# Patient Record
Sex: FEMALE | Race: WHITE | NOT HISPANIC OR LATINO | Employment: UNEMPLOYED | URBAN - METROPOLITAN AREA
[De-identification: names, ages, dates, MRNs, and addresses within clinical notes are randomized per-mention and may not be internally consistent; named-entity substitution may affect disease eponyms.]

---

## 2020-11-17 ENCOUNTER — OFFICE VISIT (OUTPATIENT)
Dept: OBGYN CLINIC | Facility: CLINIC | Age: 45
End: 2020-11-17
Payer: COMMERCIAL

## 2020-11-17 VITALS — TEMPERATURE: 98.4 F | SYSTOLIC BLOOD PRESSURE: 122 MMHG | DIASTOLIC BLOOD PRESSURE: 80 MMHG | WEIGHT: 167 LBS

## 2020-11-17 DIAGNOSIS — Z11.3 SCREEN FOR STD (SEXUALLY TRANSMITTED DISEASE): Primary | ICD-10-CM

## 2020-11-17 DIAGNOSIS — L29.2 VULVAR ITCHING: ICD-10-CM

## 2020-11-17 LAB
BV WHIFF TEST VAG QL: NORMAL
CLUE CELLS SPEC QL WET PREP: NEGATIVE
PH SMN: NORMAL [PH]
SL AMB POCT WET MOUNT: NORMAL
T VAGINALIS VAG QL WET PREP: NEGATIVE
YEAST VAG QL WET PREP: NEGATIVE

## 2020-11-17 PROCEDURE — 87210 SMEAR WET MOUNT SALINE/INK: CPT | Performed by: STUDENT IN AN ORGANIZED HEALTH CARE EDUCATION/TRAINING PROGRAM

## 2020-11-17 PROCEDURE — 87491 CHLMYD TRACH DNA AMP PROBE: CPT | Performed by: STUDENT IN AN ORGANIZED HEALTH CARE EDUCATION/TRAINING PROGRAM

## 2020-11-17 PROCEDURE — 99213 OFFICE O/P EST LOW 20 MIN: CPT | Performed by: STUDENT IN AN ORGANIZED HEALTH CARE EDUCATION/TRAINING PROGRAM

## 2020-11-17 PROCEDURE — 87661 TRICHOMONAS VAGINALIS AMPLIF: CPT | Performed by: STUDENT IN AN ORGANIZED HEALTH CARE EDUCATION/TRAINING PROGRAM

## 2020-11-17 PROCEDURE — 87481 CANDIDA DNA AMP PROBE: CPT | Performed by: STUDENT IN AN ORGANIZED HEALTH CARE EDUCATION/TRAINING PROGRAM

## 2020-11-17 PROCEDURE — 87591 N.GONORRHOEAE DNA AMP PROB: CPT | Performed by: STUDENT IN AN ORGANIZED HEALTH CARE EDUCATION/TRAINING PROGRAM

## 2020-11-17 PROCEDURE — 87801 DETECT AGNT MULT DNA AMPLI: CPT | Performed by: STUDENT IN AN ORGANIZED HEALTH CARE EDUCATION/TRAINING PROGRAM

## 2020-11-17 RX ORDER — OMEPRAZOLE 40 MG/1
40 CAPSULE, DELAYED RELEASE ORAL AS NEEDED
COMMUNITY

## 2020-11-17 RX ORDER — ASPIRIN 81 MG/1
1 TABLET ORAL DAILY
COMMUNITY

## 2020-11-17 RX ORDER — COPPER 313.4 MG/1
1 INTRAUTERINE DEVICE INTRAUTERINE ONCE
COMMUNITY

## 2020-11-18 ENCOUNTER — TELEPHONE (OUTPATIENT)
Dept: OBGYN CLINIC | Facility: CLINIC | Age: 45
End: 2020-11-18

## 2020-11-18 LAB
C GLABRATA DNA VAG QL NAA+PROBE: NEGATIVE
C KRUSEI DNA VAG QL NAA+PROBE: NEGATIVE
CANDIDA SP 6 PNL VAG NAA+PROBE: NEGATIVE
T VAGINALIS DNA VAG QL NAA+PROBE: NEGATIVE
VAGINOSIS/ITIS DNA PNL VAG PROBE+SIG AMP: NEGATIVE

## 2020-11-19 LAB
C TRACH DNA SPEC QL NAA+PROBE: NEGATIVE
N GONORRHOEA DNA SPEC QL NAA+PROBE: NEGATIVE

## 2020-12-01 ENCOUNTER — TELEPHONE (OUTPATIENT)
Dept: OBGYN CLINIC | Facility: CLINIC | Age: 45
End: 2020-12-01

## 2020-12-01 DIAGNOSIS — L29.2 VULVAR ITCHING: Primary | ICD-10-CM

## 2020-12-01 RX ORDER — CLOBETASOL PROPIONATE 0.5 MG/G
OINTMENT TOPICAL 2 TIMES DAILY
Qty: 30 G | Refills: 0 | Status: SHIPPED | OUTPATIENT
Start: 2020-12-01

## 2021-01-04 ENCOUNTER — TELEPHONE (OUTPATIENT)
Dept: OBGYN CLINIC | Facility: CLINIC | Age: 46
End: 2021-01-04

## 2021-01-04 NOTE — TELEPHONE ENCOUNTER
----- Message from Kiya Maki sent at 1/4/2021  2:00 PM EST -----  Regarding: Visit Follow-Up Question  Contact: [phone]  Hi Dr Ping Jackson,    I hope you had a [address]! The cream helps but I still have the redness/itchy issue  I know your team mentioned a biopsy  Please let me know if that is the direction we should go in? My insurance starts Frankie 15 and I should get my period around that time  Thank you for your help     Lolly Malcolm

## 2021-01-04 NOTE — TELEPHONE ENCOUNTER
Yes I would recommend a biopsy when your insurance is all set  The alternative would be to get a second opinion with dermatology which we can do pre or post biopsy

## 2021-01-20 ENCOUNTER — OFFICE VISIT (OUTPATIENT)
Dept: FAMILY MEDICINE CLINIC | Facility: CLINIC | Age: 46
End: 2021-01-20
Payer: COMMERCIAL

## 2021-01-20 ENCOUNTER — APPOINTMENT (OUTPATIENT)
Dept: LAB | Facility: HOSPITAL | Age: 46
End: 2021-01-20
Attending: FAMILY MEDICINE
Payer: COMMERCIAL

## 2021-01-20 ENCOUNTER — TELEPHONE (OUTPATIENT)
Dept: FAMILY MEDICINE CLINIC | Facility: CLINIC | Age: 46
End: 2021-01-20

## 2021-01-20 VITALS
SYSTOLIC BLOOD PRESSURE: 130 MMHG | OXYGEN SATURATION: 98 % | TEMPERATURE: 97.2 F | WEIGHT: 160 LBS | HEART RATE: 100 BPM | DIASTOLIC BLOOD PRESSURE: 76 MMHG | BODY MASS INDEX: 25.11 KG/M2 | HEIGHT: 67 IN

## 2021-01-20 DIAGNOSIS — Z11.3 SCREEN FOR STD (SEXUALLY TRANSMITTED DISEASE): ICD-10-CM

## 2021-01-20 DIAGNOSIS — Z13.6 ENCOUNTER FOR LIPID SCREENING FOR CARDIOVASCULAR DISEASE: ICD-10-CM

## 2021-01-20 DIAGNOSIS — Z12.31 ENCOUNTER FOR SCREENING MAMMOGRAM FOR BREAST CANCER: ICD-10-CM

## 2021-01-20 DIAGNOSIS — Z13.220 ENCOUNTER FOR LIPID SCREENING FOR CARDIOVASCULAR DISEASE: Primary | ICD-10-CM

## 2021-01-20 DIAGNOSIS — Z00.00 ANNUAL PHYSICAL EXAM: Primary | ICD-10-CM

## 2021-01-20 DIAGNOSIS — Z13.220 ENCOUNTER FOR LIPID SCREENING FOR CARDIOVASCULAR DISEASE: ICD-10-CM

## 2021-01-20 DIAGNOSIS — Z13.6 ENCOUNTER FOR LIPID SCREENING FOR CARDIOVASCULAR DISEASE: Primary | ICD-10-CM

## 2021-01-20 DIAGNOSIS — Z00.00 ANNUAL PHYSICAL EXAM: ICD-10-CM

## 2021-01-20 DIAGNOSIS — R20.1 IMPAIRED SENSATION: ICD-10-CM

## 2021-01-20 PROBLEM — Z83.3 FAMILY HISTORY OF DIABETES MELLITUS IN FATHER: Status: ACTIVE | Noted: 2020-07-21

## 2021-01-20 PROBLEM — Z86.718 HISTORY OF DVT (DEEP VEIN THROMBOSIS): Status: ACTIVE | Noted: 2020-07-21

## 2021-01-20 LAB
ANION GAP SERPL CALCULATED.3IONS-SCNC: 8 MMOL/L (ref 4–13)
BASOPHILS # BLD AUTO: 0.07 THOUSANDS/ΜL (ref 0–0.1)
BASOPHILS NFR BLD AUTO: 1 % (ref 0–1)
BUN SERPL-MCNC: 19 MG/DL (ref 5–25)
CALCIUM SERPL-MCNC: 9 MG/DL (ref 8.3–10.1)
CHLORIDE SERPL-SCNC: 104 MMOL/L (ref 100–108)
CHOLEST SERPL-MCNC: 186 MG/DL (ref 50–200)
CO2 SERPL-SCNC: 29 MMOL/L (ref 21–32)
CREAT SERPL-MCNC: 1.11 MG/DL (ref 0.6–1.3)
EOSINOPHIL # BLD AUTO: 0.15 THOUSAND/ΜL (ref 0–0.61)
EOSINOPHIL NFR BLD AUTO: 3 % (ref 0–6)
ERYTHROCYTE [DISTWIDTH] IN BLOOD BY AUTOMATED COUNT: 12.4 % (ref 11.6–15.1)
GFR SERPL CREATININE-BSD FRML MDRD: 61 ML/MIN/1.73SQ M
GLUCOSE P FAST SERPL-MCNC: 90 MG/DL (ref 65–99)
HCT VFR BLD AUTO: 45.5 % (ref 34.8–46.1)
HDLC SERPL-MCNC: 56 MG/DL
HGB BLD-MCNC: 14.7 G/DL (ref 11.5–15.4)
IMM GRANULOCYTES # BLD AUTO: 0.01 THOUSAND/UL (ref 0–0.2)
IMM GRANULOCYTES NFR BLD AUTO: 0 % (ref 0–2)
LDLC SERPL CALC-MCNC: 98 MG/DL (ref 0–100)
LYMPHOCYTES # BLD AUTO: 1.95 THOUSANDS/ΜL (ref 0.6–4.47)
LYMPHOCYTES NFR BLD AUTO: 35 % (ref 14–44)
MCH RBC QN AUTO: 28.4 PG (ref 26.8–34.3)
MCHC RBC AUTO-ENTMCNC: 32.3 G/DL (ref 31.4–37.4)
MCV RBC AUTO: 88 FL (ref 82–98)
MONOCYTES # BLD AUTO: 0.46 THOUSAND/ΜL (ref 0.17–1.22)
MONOCYTES NFR BLD AUTO: 8 % (ref 4–12)
NEUTROPHILS # BLD AUTO: 3.01 THOUSANDS/ΜL (ref 1.85–7.62)
NEUTS SEG NFR BLD AUTO: 53 % (ref 43–75)
NONHDLC SERPL-MCNC: 130 MG/DL
NRBC BLD AUTO-RTO: 0 /100 WBCS
PLATELET # BLD AUTO: 351 THOUSANDS/UL (ref 149–390)
PMV BLD AUTO: 9.9 FL (ref 8.9–12.7)
POTASSIUM SERPL-SCNC: 4.1 MMOL/L (ref 3.5–5.3)
RBC # BLD AUTO: 5.18 MILLION/UL (ref 3.81–5.12)
SODIUM SERPL-SCNC: 141 MMOL/L (ref 136–145)
TRIGL SERPL-MCNC: 159 MG/DL
WBC # BLD AUTO: 5.65 THOUSAND/UL (ref 4.31–10.16)

## 2021-01-20 PROCEDURE — 3008F BODY MASS INDEX DOCD: CPT | Performed by: FAMILY MEDICINE

## 2021-01-20 PROCEDURE — 80048 BASIC METABOLIC PNL TOTAL CA: CPT

## 2021-01-20 PROCEDURE — 1036F TOBACCO NON-USER: CPT | Performed by: FAMILY MEDICINE

## 2021-01-20 PROCEDURE — 86803 HEPATITIS C AB TEST: CPT

## 2021-01-20 PROCEDURE — 85025 COMPLETE CBC W/AUTO DIFF WBC: CPT

## 2021-01-20 PROCEDURE — 36415 COLL VENOUS BLD VENIPUNCTURE: CPT

## 2021-01-20 PROCEDURE — 86592 SYPHILIS TEST NON-TREP QUAL: CPT

## 2021-01-20 PROCEDURE — 87340 HEPATITIS B SURFACE AG IA: CPT

## 2021-01-20 PROCEDURE — 3725F SCREEN DEPRESSION PERFORMED: CPT | Performed by: FAMILY MEDICINE

## 2021-01-20 PROCEDURE — 80061 LIPID PANEL: CPT

## 2021-01-20 PROCEDURE — 87389 HIV-1 AG W/HIV-1&-2 AB AG IA: CPT

## 2021-01-20 PROCEDURE — 99386 PREV VISIT NEW AGE 40-64: CPT | Performed by: FAMILY MEDICINE

## 2021-01-20 RX ORDER — FAMOTIDINE 10 MG
20 TABLET ORAL DAILY
COMMUNITY

## 2021-01-20 NOTE — PATIENT INSTRUCTIONS

## 2021-01-20 NOTE — PROGRESS NOTES
[address]     NAME: Luz Elena Lara  AGE: 41 y o  SEX: female  : 1975     DATE: 2021     Assessment and Plan:     Problem List Items Addressed This Visit     None      Visit Diagnoses     Annual physical exam    -  Primary    Relevant Orders    CBC and differential    Basic metabolic panel    Lipid panel    Encounter for screening mammogram for breast cancer        Relevant Orders    Mammo screening bilateral w cad    BMI 25 0-25 9,adult        Relevant Orders    CBC and differential    Basic metabolic panel    Lipid panel    Encounter for lipid screening for cardiovascular disease        Relevant Orders    Lipid panel    Impaired sensation    B/L UE, residual since her C spine surgery  Relevant Orders    Ambulatory referral to Neurology        Immunizations and preventive care screenings were discussed with patient today  Appropriate education was printed on patient's after visit summary  Counseling:  Alcohol/drug use: discussed moderation in alcohol intake, the recommendations for healthy alcohol use, and avoidance of illicit drug use  Dental Health: discussed importance of regular tooth brushing, flossing, and dental visits  Injury prevention: discussed safety/seat belts, safety helmets, smoke detectors, carbon dioxide detectors, and smoking near bedding or upholstery  Sexual health: discussed sexually transmitted diseases, partner selection, use of condoms, avoidance of unintended pregnancy, and contraceptive alternatives  · Exercise: the importance of regular exercise/physical activity was discussed  Recommend exercise 3-5 times per week for at least 30 minutes  BMI Counseling: Body mass index is 25 41 kg/m²   The BMI is above normal  Nutrition recommendations include decreasing portion sizes, encouraging healthy choices of fruits and vegetables, consuming healthier snacks, limiting drinks that contain sugar, moderation in carbohydrate intake, increasing intake of lean protein and reducing intake of cholesterol  Exercise recommendations include moderate physical activity 150 minutes/week and exercising 3-5 times per week  No pharmacotherapy was ordered  Return in about 1 year (around 1/20/2022) for Annual physical      Chief Complaint:     Chief Complaint   Patient presents with    New Patient Visit    Circulatory Problem      History of Present Illness:     Adult Annual Physical   Patient here for a comprehensive physical exam  The patient reports problems - as documented below  Patient is new to the area  States that she was in the middle of a work up for her leg pain and UE sensory deficits  She has a history of C spine surgery with residual sensory deficits  She has previously seen pain management  She was referred to neurology but was not about to follow up due to the Pandemic and moving per patient  She would like to be evaluated by a neurologist at this time  Diet and Physical Activity  · Diet/Nutrition: well balanced diet  · Exercise: walking, moderate cardiovascular exercise, 3-4 times a week on average and 30-60 minutes on average  Depression Screening  PHQ-9 Depression Screening    PHQ-9:   Frequency of the following problems over the past two weeks:      Little interest or pleasure in doing things: 0 - not at all  Feeling down, depressed, or hopeless: 0 - not at all  PHQ-2 Score: 0       General Health  · Sleep: sleeps well  · Hearing: normal - bilateral   · Vision: previous LASIK surgery  · Dental: regular dental visits, brushes teeth once daily and flosses teeth occasionally  /GYN Health  · Patient is: premenopausal  · Last menstrual period: 1/12/21  · Contraceptive method: IUD placement  · Sexually active with one male partner        Review of Systems:     Review of Systems   Constitutional: Negative for activity change, appetite change, fatigue and fever  HENT: Negative for congestion, ear pain, rhinorrhea and sore throat  Eyes: Negative for pain  Respiratory: Negative for cough and shortness of breath  Cardiovascular: Negative for chest pain and leg swelling  Gastrointestinal: Negative for abdominal pain, constipation, diarrhea, nausea and vomiting  Genitourinary: Negative for dysuria, frequency and urgency  Musculoskeletal: Negative for gait problem  Skin: Negative for rash  Neurological: Negative for dizziness, tremors, weakness, light-headedness and headaches         Past Medical History:     Past Medical History:   Diagnosis Date    Chlamydia     IBS (irritable bowel syndrome)     Migraines       Past Surgical History:     Past Surgical History:   Procedure Laterality Date    FOOT SURGERY Bilateral     NECK SURGERY        Social History:      Social History     Socioeconomic History    Marital status: Single     Spouse name: None    Number of children: None    Years of education: None    Highest education level: None   Occupational History    None   Social Needs    Financial resource strain: None    Food insecurity     Worry: None     Inability: None    Transportation needs     Medical: None     Non-medical: None   Tobacco Use    Smoking status: Never Smoker    Smokeless tobacco: Never Used   Substance and Sexual Activity    Alcohol use: Yes     Comment: social    Drug use: Never    Sexual activity: Yes     Partners: Male     Birth control/protection: None   Lifestyle    Physical activity     Days per week: None     Minutes per session: None    Stress: None   Relationships    Social connections     Talks on phone: None     Gets together: None     Attends Latter day service: None     Active member of club or organization: None     Attends meetings of clubs or organizations: None     Relationship status: None    Intimate partner violence     Fear of current or ex partner: None     Emotionally abused: None     Physically abused: None     Forced sexual activity: None   Other Topics Concern    None   Social History Narrative    None      Family History:     Family History   Problem Relation Age of Onset    Hyperlipidemia Mother     Diabetes Father       Current Medications:     Current Outpatient Medications   Medication Sig Dispense Refill    aspirin (Aspirin 81) 81 mg EC tablet Take 1 tablet by mouth daily       famotidine (PEPCID) 10 mg tablet Take 10 mg by mouth daily      intrauterine copper (PARAGARD) IUD 1 each by Intrauterine route once      omeprazole (PriLOSEC) 40 MG capsule Take 40 mg by mouth daily       clobetasol (TEMOVATE) 0 05 % ointment Apply topically 2 (two) times a day (Patient not taking: Reported on 1/20/2021) 30 g 0     No current facility-administered medications for this visit  Allergies:     No Known Allergies   Physical Exam:     /76   Pulse 100   Temp (!) 97 2 °F (36 2 °C)   Ht 5' 6 54" (1 69 m)   Wt 72 6 kg (160 lb)   LMP 01/12/2021   SpO2 98%   BMI 25 41 kg/m²     Physical Exam  Vitals signs reviewed  Constitutional:       General: She is not in acute distress  Appearance: Normal appearance  HENT:      Head: Normocephalic and atraumatic  Right Ear: External ear normal       Left Ear: External ear normal       Nose: Nose normal       Mouth/Throat:      Mouth: Mucous membranes are moist    Eyes:      Extraocular Movements: Extraocular movements intact  Conjunctiva/sclera: Conjunctivae normal       Pupils: Pupils are equal, round, and reactive to light  Neck:      Musculoskeletal: Neck supple  Cardiovascular:      Rate and Rhythm: Normal rate and regular rhythm  Heart sounds: Normal heart sounds  Pulmonary:      Effort: Pulmonary effort is normal       Breath sounds: Normal breath sounds  No wheezing, rhonchi or rales  Abdominal:      General: Bowel sounds are normal  There is no distension        Palpations: Abdomen is soft       Tenderness: There is no abdominal tenderness  Musculoskeletal:      Right lower leg: No edema  Left lower leg: No edema  Lymphadenopathy:      Cervical: No cervical adenopathy  Skin:     General: Skin is warm  Capillary Refill: Capillary refill takes less than 2 seconds  Findings: No rash  Neurological:      Mental Status: She is alert and oriented to person, place, and time  Mental status is at baseline  Cranial Nerves: No cranial nerve deficit  Sensory: Sensory deficit (B/L UE and hands) present  Motor: No weakness          Socorro Armenta DO  [address]

## 2021-01-21 LAB
HBV SURFACE AG SER QL: NORMAL
HCV AB SER QL: NORMAL
HIV 1+2 AB+HIV1 P24 AG SERPL QL IA: NORMAL
RPR SER QL: NORMAL

## 2021-01-22 ENCOUNTER — OFFICE VISIT (OUTPATIENT)
Dept: OBGYN CLINIC | Facility: MEDICAL CENTER | Age: 46
End: 2021-01-22
Payer: COMMERCIAL

## 2021-01-22 VITALS — WEIGHT: 166 LBS | DIASTOLIC BLOOD PRESSURE: 80 MMHG | SYSTOLIC BLOOD PRESSURE: 118 MMHG | BODY MASS INDEX: 26.36 KG/M2

## 2021-01-22 DIAGNOSIS — L29.2 VULVAR ITCHING: Primary | ICD-10-CM

## 2021-01-22 PROCEDURE — 88305 TISSUE EXAM BY PATHOLOGIST: CPT | Performed by: PATHOLOGY

## 2021-01-22 PROCEDURE — 57100 BIOPSY VAGINAL MUCOSA SIMPLE: CPT | Performed by: STUDENT IN AN ORGANIZED HEALTH CARE EDUCATION/TRAINING PROGRAM

## 2021-01-22 PROCEDURE — 88312 SPECIAL STAINS GROUP 1: CPT | Performed by: PATHOLOGY

## 2021-01-22 NOTE — PROGRESS NOTES
Biopsy    Date/Time: 1/22/2021 10:01 AM  Performed by: Raz Man MD  Authorized by: Raz Man MD   Universal Protocol:  Consent: Verbal consent obtained  Written consent obtained  Consent given by: patient  Patient understanding: patient states understanding of the procedure being performed  Patient consent: the patient's understanding of the procedure matches consent given  Procedure consent: procedure consent matches procedure scheduled  Relevant documents: relevant documents present and verified  Patient identity confirmed: verbally with patient      Procedure Details - Skin Biopsy:     Biopsy tissue type: skin    Biopsy method: punch biopsy      Initial size (mm):  6    Final defect size (mm):  6    Malignancy: benign lesion      Destruction method comment:  6 mm     Patient with prior vulvar irritation much improved with higher potency steroid but still mildly bothersome  Would like biopsy to ensure no malignant process and reviewed this was very unlikely  May further characterize type of skin lesion and process- 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

## 2021-03-10 DIAGNOSIS — Z23 ENCOUNTER FOR IMMUNIZATION: ICD-10-CM

## 2021-03-15 ENCOUNTER — IMMUNIZATIONS (OUTPATIENT)
Dept: FAMILY MEDICINE CLINIC | Facility: HOSPITAL | Age: 46
End: 2021-03-15

## 2021-03-15 DIAGNOSIS — Z23 ENCOUNTER FOR IMMUNIZATION: Primary | ICD-10-CM

## 2021-03-15 PROCEDURE — 0001A SARS-COV-2 / COVID-19 MRNA VACCINE (PFIZER-BIONTECH) 30 MCG: CPT

## 2021-03-15 PROCEDURE — 91300 SARS-COV-2 / COVID-19 MRNA VACCINE (PFIZER-BIONTECH) 30 MCG: CPT

## 2021-03-18 ENCOUNTER — OFFICE VISIT (OUTPATIENT)
Dept: GASTROENTEROLOGY | Facility: CLINIC | Age: 46
End: 2021-03-18
Payer: COMMERCIAL

## 2021-03-18 VITALS
DIASTOLIC BLOOD PRESSURE: 78 MMHG | BODY MASS INDEX: 24.99 KG/M2 | HEART RATE: 88 BPM | SYSTOLIC BLOOD PRESSURE: 114 MMHG | HEIGHT: 67 IN | WEIGHT: 159.2 LBS

## 2021-03-18 DIAGNOSIS — K21.9 GASTROESOPHAGEAL REFLUX DISEASE WITHOUT ESOPHAGITIS: Primary | ICD-10-CM

## 2021-03-18 DIAGNOSIS — K58.1 IRRITABLE BOWEL SYNDROME WITH CONSTIPATION: ICD-10-CM

## 2021-03-18 PROCEDURE — 99204 OFFICE O/P NEW MOD 45 MIN: CPT | Performed by: PHYSICIAN ASSISTANT

## 2021-03-18 RX ORDER — DICYCLOMINE HCL 20 MG
20 TABLET ORAL EVERY 6 HOURS PRN
Qty: 30 TABLET | Refills: 0 | Status: SHIPPED | OUTPATIENT
Start: 2021-03-18

## 2021-03-18 NOTE — PROGRESS NOTES
[facility]  Az Baker 41 y o  female MRN: [id number]       CC: New patient to establish with GI, chronic reported IBS and GERD    HPI:  Vincent Dempsey is a 55-year-old female with history of self-reported IBS-C  Patient is here to establish with GI  She works for [employer], and has been moving around the country for most of her adult life  She has not seen a GI specialist in quite some time, and last had follow-up in [place]  She reports that she had EGD and colonoscopy last 5-10 years ago in [place]  However, that physician retired  She reports chronic history of lower abdominal cramping and recently has had worsening and bowel frequency  She reports that she is having bowel movements only once weekly with pebble like stool  She denies unintentional weight loss  She has occasional rectal bleeding  In terms of her upper GI symptoms, she does suffer from chronic reflux  She has mostly globus sensation  She denies nausea, vomiting, odynophagia or dysphagia  She is on omeprazole, and famotidine  To her knowledge, there is no family history of IBD or colon cancer  Review of Systems:    CONSTITUTIONAL: Denies any fever, chills, or rigors  Good appetite, and no recent weight loss  HEENT: No earache or tinnitus  Denies hearing loss or visual disturbances  CARDIOVASCULAR: No chest pain or palpitations  RESPIRATORY: Denies any cough, hemoptysis, shortness of breath or dyspnea on exertion  GASTROINTESTINAL: As noted in the History of Present Illness  GENITOURINARY: No problems with urination  Denies any hematuria or dysuria  NEUROLOGIC: No dizziness or vertigo, denies headaches  MUSCULOSKELETAL: Denies any muscle or joint pain  SKIN: Denies skin rashes or itching  ENDOCRINE: Denies excessive thirst  Denies intolerance to heat or cold  PSYCHOSOCIAL: Denies depression or anxiety  Denies any recent memory loss         Current Outpatient Medications   Medication Sig Dispense Refill    aspirin (Aspirin 81) 81 mg EC tablet Take 1 tablet by mouth daily       clobetasol (TEMOVATE) 0 05 % ointment Apply topically 2 (two) times a day 30 g 0    famotidine (PEPCID) 10 mg tablet Take 20 mg by mouth daily       intrauterine copper (PARAGARD) IUD 1 each by Intrauterine route once      omeprazole (PriLOSEC) 40 MG capsule Take 40 mg by mouth daily       dicyclomine (BENTYL) 20 mg tablet Take 1 tablet (20 mg total) by mouth every 6 (six) hours as needed (abdominal cramping) 30 tablet 0     No current facility-administered medications for this visit        Past Medical History:   Diagnosis Date    Chlamydia     IBS (irritable bowel syndrome)     Migraines      Past Surgical History:   Procedure Laterality Date    FOOT SURGERY Bilateral     NECK SURGERY       Social History     Socioeconomic History    Marital status: Single     Spouse name: None    Number of children: None    Years of education: None    Highest education level: None   Occupational History    None   Social Needs    Financial resource strain: None    Food insecurity     Worry: None     Inability: None    Transportation needs     Medical: None     Non-medical: None   Tobacco Use    Smoking status: Never Smoker    Smokeless tobacco: Never Used   Substance and Sexual Activity    Alcohol use: Yes     Comment: social    Drug use: Never    Sexual activity: Yes     Partners: Male     Birth control/protection: None   Lifestyle    Physical activity     Days per week: None     Minutes per session: None    Stress: None   Relationships    Social connections     Talks on phone: None     Gets together: None     Attends Hoahaoism service: None     Active member of club or organization: None     Attends meetings of clubs or organizations: None     Relationship status: None    Intimate partner violence     Fear of current or ex partner: None     Emotionally abused: None     Physically abused: None     Forced sexual activity: None   Other Topics Concern    None   Social History Narrative    None     Family History   Problem Relation Age of Onset    Hyperlipidemia Mother     Diabetes Father             PHYSICAL EXAM:    Vitals:    03/18/21 1334   BP: 114/78   Pulse: 88   Weight: 72 2 kg (159 lb 3 2 oz)   Height: 5' 6 54" (1 69 m)     General Appearance:   Alert and oriented x 3  Cooperative, and in no respiratory distress   HEENT:   Normocephalic, atraumatic, anicteric      Neck:  Supple, symmetrical, trachea midline   Lungs:   Clear to auscultation bilaterally    Heart[de-identified]   Regular rate and rhythm   Abdomen:   Soft, non-tender, non-distended; normal bowel sounds; no masses, no organomegaly    Genitalia:   Deferred    Rectal:   Deferred    Extremities:  No cyanosis, clubbing or edema    Pulses:  2+ and symmetric all extremities    Skin:  Skin color, texture, turgor normal, no rashes or lesions    Lymph nodes:  No palpable cervical or supraclavicular lymphadenopathy        Lab Results:   Results from last 6 Months   Lab Units 01/20/21  0907   WBC Thousand/uL 5 65   HEMOGLOBIN g/dL 14 7   HEMATOCRIT % 45 5   PLATELETS Thousands/uL 351   NEUTROS PCT % 53   LYMPHS PCT % 35   MONOS PCT % 8   EOS PCT % 3     Results from last 6 Months   Lab Units 01/20/21  0907   POTASSIUM mmol/L 4 1   CHLORIDE mmol/L 104   CO2 mmol/L 29   BUN mg/dL 19   CREATININE mg/dL 1 11   CALCIUM mg/dL 9 0               Imaging Studies: I have personally reviewed pertinent imaging studies  Patient was never admitted  ASSESSMENT and PLAN:      1)   Lower abdominal pain, worsening bowel frequency with self-reported history of IBS -  Unfortunately, I do not have her prior records  She reports that her last scope was 5-10 years ago in [place]    She is looking for medication to regulate her bowel habits   -   Given that she is new to us and has worsening constipation, will plan for colonoscopy to investigate  -  I provided her samples of Linzess 290 micro g which she may start after colonoscopy, as long as there are no worrisome findings  -  I also provided Bentyl for abdominal cramping    2) Chronic globus sensation consistent with silent GERD - EGD to investigate  Follow up after EGD/colonoscopy

## 2021-03-18 NOTE — H&P (VIEW-ONLY)
[facility]  Wyrogecami Jarad 41 y o  female MRN: [id number]       CC: New patient to establish with GI, chronic reported IBS and GERD    HPI:  Bridget Ford is a 27-year-old female with history of self-reported IBS-C  Patient is here to establish with GI  She works for [employer], and has been moving around the country for most of her adult life  She has not seen a GI specialist in quite some time, and last had follow-up in [place]  She reports that she had EGD and colonoscopy last 5-10 years ago in [place]  However, that physician retired  She reports chronic history of lower abdominal cramping and recently has had worsening and bowel frequency  She reports that she is having bowel movements only once weekly with pebble like stool  She denies unintentional weight loss  She has occasional rectal bleeding  In terms of her upper GI symptoms, she does suffer from chronic reflux  She has mostly globus sensation  She denies nausea, vomiting, odynophagia or dysphagia  She is on omeprazole, and famotidine  To her knowledge, there is no family history of IBD or colon cancer  Review of Systems:    CONSTITUTIONAL: Denies any fever, chills, or rigors  Good appetite, and no recent weight loss  HEENT: No earache or tinnitus  Denies hearing loss or visual disturbances  CARDIOVASCULAR: No chest pain or palpitations  RESPIRATORY: Denies any cough, hemoptysis, shortness of breath or dyspnea on exertion  GASTROINTESTINAL: As noted in the History of Present Illness  GENITOURINARY: No problems with urination  Denies any hematuria or dysuria  NEUROLOGIC: No dizziness or vertigo, denies headaches  MUSCULOSKELETAL: Denies any muscle or joint pain  SKIN: Denies skin rashes or itching  ENDOCRINE: Denies excessive thirst  Denies intolerance to heat or cold  PSYCHOSOCIAL: Denies depression or anxiety  Denies any recent memory loss         Current Outpatient Medications   Medication Sig Dispense Refill    aspirin (Aspirin 81) 81 mg EC tablet Take 1 tablet by mouth daily       clobetasol (TEMOVATE) 0 05 % ointment Apply topically 2 (two) times a day 30 g 0    famotidine (PEPCID) 10 mg tablet Take 20 mg by mouth daily       intrauterine copper (PARAGARD) IUD 1 each by Intrauterine route once      omeprazole (PriLOSEC) 40 MG capsule Take 40 mg by mouth daily       dicyclomine (BENTYL) 20 mg tablet Take 1 tablet (20 mg total) by mouth every 6 (six) hours as needed (abdominal cramping) 30 tablet 0     No current facility-administered medications for this visit        Past Medical History:   Diagnosis Date    Chlamydia     IBS (irritable bowel syndrome)     Migraines      Past Surgical History:   Procedure Laterality Date    FOOT SURGERY Bilateral     NECK SURGERY       Social History     Socioeconomic History    Marital status: Single     Spouse name: None    Number of children: None    Years of education: None    Highest education level: None   Occupational History    None   Social Needs    Financial resource strain: None    Food insecurity     Worry: None     Inability: None    Transportation needs     Medical: None     Non-medical: None   Tobacco Use    Smoking status: Never Smoker    Smokeless tobacco: Never Used   Substance and Sexual Activity    Alcohol use: Yes     Comment: social    Drug use: Never    Sexual activity: Yes     Partners: Male     Birth control/protection: None   Lifestyle    Physical activity     Days per week: None     Minutes per session: None    Stress: None   Relationships    Social connections     Talks on phone: None     Gets together: None     Attends Mormon service: None     Active member of club or organization: None     Attends meetings of clubs or organizations: None     Relationship status: None    Intimate partner violence     Fear of current or ex partner: None     Emotionally abused: None     Physically abused: None     Forced sexual activity: None   Other Topics Concern    None   Social History Narrative    None     Family History   Problem Relation Age of Onset    Hyperlipidemia Mother     Diabetes Father             PHYSICAL EXAM:    Vitals:    03/18/21 1334   BP: 114/78   Pulse: 88   Weight: 72 2 kg (159 lb 3 2 oz)   Height: 5' 6 54" (1 69 m)     General Appearance:   Alert and oriented x 3  Cooperative, and in no respiratory distress   HEENT:   Normocephalic, atraumatic, anicteric      Neck:  Supple, symmetrical, trachea midline   Lungs:   Clear to auscultation bilaterally    Heart[de-identified]   Regular rate and rhythm   Abdomen:   Soft, non-tender, non-distended; normal bowel sounds; no masses, no organomegaly    Genitalia:   Deferred    Rectal:   Deferred    Extremities:  No cyanosis, clubbing or edema    Pulses:  2+ and symmetric all extremities    Skin:  Skin color, texture, turgor normal, no rashes or lesions    Lymph nodes:  No palpable cervical or supraclavicular lymphadenopathy        Lab Results:   Results from last 6 Months   Lab Units 01/20/21  0907   WBC Thousand/uL 5 65   HEMOGLOBIN g/dL 14 7   HEMATOCRIT % 45 5   PLATELETS Thousands/uL 351   NEUTROS PCT % 53   LYMPHS PCT % 35   MONOS PCT % 8   EOS PCT % 3     Results from last 6 Months   Lab Units 01/20/21  0907   POTASSIUM mmol/L 4 1   CHLORIDE mmol/L 104   CO2 mmol/L 29   BUN mg/dL 19   CREATININE mg/dL 1 11   CALCIUM mg/dL 9 0               Imaging Studies: I have personally reviewed pertinent imaging studies  Patient was never admitted  ASSESSMENT and PLAN:      1)   Lower abdominal pain, worsening bowel frequency with self-reported history of IBS -  Unfortunately, I do not have her prior records  She reports that her last scope was 5-10 years ago in [place]    She is looking for medication to regulate her bowel habits   -   Given that she is new to us and has worsening constipation, will plan for colonoscopy to investigate  -  I provided her samples of Linzess 290 micro g which she may start after colonoscopy, as long as there are no worrisome findings  -  I also provided Bentyl for abdominal cramping    2) Chronic globus sensation consistent with silent GERD - EGD to investigate  Follow up after EGD/colonoscopy

## 2021-03-23 ENCOUNTER — TRANSCRIBE ORDERS (OUTPATIENT)
Dept: ADMINISTRATIVE | Facility: HOSPITAL | Age: 46
End: 2021-03-23

## 2021-03-23 ENCOUNTER — ANNUAL EXAM (OUTPATIENT)
Dept: OBGYN CLINIC | Facility: CLINIC | Age: 46
End: 2021-03-23
Payer: COMMERCIAL

## 2021-03-23 VITALS
HEIGHT: 67 IN | SYSTOLIC BLOOD PRESSURE: 110 MMHG | BODY MASS INDEX: 24.61 KG/M2 | DIASTOLIC BLOOD PRESSURE: 80 MMHG | WEIGHT: 156.8 LBS

## 2021-03-23 DIAGNOSIS — Z01.419 ENCNTR FOR GYN EXAM (GENERAL) (ROUTINE) W/O ABN FINDINGS: ICD-10-CM

## 2021-03-23 DIAGNOSIS — Z01.419 ROUTINE GYNECOLOGICAL EXAMINATION: Primary | ICD-10-CM

## 2021-03-23 DIAGNOSIS — Z12.31 OTHER SCREENING MAMMOGRAM: Primary | ICD-10-CM

## 2021-03-23 DIAGNOSIS — Z23 NEED FOR HPV VACCINATION: ICD-10-CM

## 2021-03-23 PROCEDURE — 87624 HPV HI-RISK TYP POOLED RSLT: CPT | Performed by: STUDENT IN AN ORGANIZED HEALTH CARE EDUCATION/TRAINING PROGRAM

## 2021-03-23 PROCEDURE — 1036F TOBACCO NON-USER: CPT | Performed by: STUDENT IN AN ORGANIZED HEALTH CARE EDUCATION/TRAINING PROGRAM

## 2021-03-23 PROCEDURE — 3008F BODY MASS INDEX DOCD: CPT | Performed by: STUDENT IN AN ORGANIZED HEALTH CARE EDUCATION/TRAINING PROGRAM

## 2021-03-23 PROCEDURE — 90471 IMMUNIZATION ADMIN: CPT

## 2021-03-23 PROCEDURE — 0503F POSTPARTUM CARE VISIT: CPT | Performed by: STUDENT IN AN ORGANIZED HEALTH CARE EDUCATION/TRAINING PROGRAM

## 2021-03-23 PROCEDURE — G0145 SCR C/V CYTO,THINLAYER,RESCR: HCPCS | Performed by: STUDENT IN AN ORGANIZED HEALTH CARE EDUCATION/TRAINING PROGRAM

## 2021-03-23 PROCEDURE — 99396 PREV VISIT EST AGE 40-64: CPT | Performed by: STUDENT IN AN ORGANIZED HEALTH CARE EDUCATION/TRAINING PROGRAM

## 2021-03-23 PROCEDURE — 90651 9VHPV VACCINE 2/3 DOSE IM: CPT

## 2021-03-23 NOTE — PROGRESS NOTES
Assessment/Plan:    Encntr for gyn exam (general) (routine) w/o abn findings  42 yo G0 here for annual exam      Pap cotest collected  Discussed HPV catch up- called insurance and covered 1/3 today  Mammo ordered, reviewed breast self awareness  Colonoscopy scheduled 4/2/21- IBS-c  Discussed healthy diet and exercise,  Not currently sexually active, paraguard  Diagnoses and all orders for this visit:    Routine gynecological examination  -     Cancel: Liquid-based pap, screening  -     Liquid-based pap, screening    Encntr for gyn exam (general) (routine) w/o abn findings          Subjective:      Patient ID: Luz Elena Lara is a 41 y o  female  42 yo G0 here for annual  She reports her [address] is well controlled without current itching- we discussed natural course and prn use of steroids  She has regular monthly cycles normal in flow and length  She is not currently SA  Has a paraguard due to come out 22  She is unsure is she wants another but we reviewed these options  She has a history of a LEEP about 10+ years ago and normal paps since  The following portions of the patient's history were reviewed and updated as appropriate: allergies, current medications, past family history, past medical history, past social history, past surgical history and problem list     Review of Systems   Constitutional: Negative for chills and fever  HENT: Negative for ear pain and sore throat  Eyes: Negative for pain and visual disturbance  Respiratory: Negative for cough and shortness of breath  Cardiovascular: Negative for chest pain and palpitations  Gastrointestinal: Positive for constipation  Negative for abdominal pain, diarrhea, nausea and vomiting  Genitourinary: Negative for dysuria, frequency, hematuria, pelvic pain, urgency, vaginal bleeding, vaginal discharge and vaginal pain  Musculoskeletal: Negative for arthralgias and back pain  Skin: Negative for color change and rash     Neurological: Negative for seizures and syncope  All other systems reviewed and are negative  Objective:      /80 (BP Location: Left arm, Patient Position: Sitting, Cuff Size: Standard)   Ht 5' 6 54" (1 69 m)   Wt 71 1 kg (156 lb 12 8 oz)   LMP 03/15/2021 (Approximate)   BMI 24 90 kg/m²          Physical Exam  Constitutional:       General: She is not in acute distress  Appearance: She is well-developed  She is not diaphoretic  HENT:      Head: Normocephalic and atraumatic  Neck:      Musculoskeletal: Normal range of motion and neck supple  Thyroid: No thyromegaly  Pulmonary:      Effort: Pulmonary effort is normal    Chest:      Breasts: Breasts are symmetrical          Right: No inverted nipple, mass, nipple discharge, skin change or tenderness  Left: No inverted nipple, mass, nipple discharge, skin change or tenderness  Abdominal:      General: There is no distension  Palpations: Abdomen is soft  There is no mass  Tenderness: There is no abdominal tenderness  There is no guarding or rebound  Genitourinary:     Exam position: Supine  Labia:         Right: No rash, tenderness, lesion or injury  Left: No rash, tenderness, lesion or injury  Vagina: Normal  No vaginal discharge, erythema, tenderness or bleeding  Cervix: No cervical motion tenderness, discharge or friability  Uterus: Not enlarged and not tender  Adnexa:         Right: No mass, tenderness or fullness  Left: No mass, tenderness or fullness  Comments: Prior bx site well healed  IUD strings appropriate  Lymphadenopathy:      Cervical: No cervical adenopathy  Upper Body:      Right upper body: No supraclavicular, axillary or pectoral adenopathy  Left upper body: No supraclavicular, axillary or pectoral adenopathy

## 2021-03-23 NOTE — PROGRESS NOTES
Pt here for 1st gardasil vaccine   LOT: [id number]  EXP: June 21, 2022  Given in left arm   Tolerated well   VIS given to patient

## 2021-03-23 NOTE — ASSESSMENT & PLAN NOTE
40 yo G0 here for annual exam      Pap cotest collected  Discussed HPV catch up- called insurance and covered 1/3 today  Mammo ordered, reviewed breast self awareness  Colonoscopy scheduled 4/2/21- IBS-c  Discussed healthy diet and exercise,  Not currently sexually active, paraguard

## 2021-03-25 LAB
HPV HR 12 DNA CVX QL NAA+PROBE: NEGATIVE
HPV16 DNA CVX QL NAA+PROBE: NEGATIVE
HPV18 DNA CVX QL NAA+PROBE: NEGATIVE

## 2021-03-27 LAB
LAB AP GYN PRIMARY INTERPRETATION: NORMAL
Lab: NORMAL

## 2021-03-29 ENCOUNTER — TELEPHONE (OUTPATIENT)
Dept: PAIN MEDICINE | Facility: CLINIC | Age: 46
End: 2021-03-29

## 2021-04-02 ENCOUNTER — ANESTHESIA (OUTPATIENT)
Dept: GASTROENTEROLOGY | Facility: HOSPITAL | Age: 46
End: 2021-04-02

## 2021-04-02 ENCOUNTER — HOSPITAL ENCOUNTER (OUTPATIENT)
Dept: GASTROENTEROLOGY | Facility: HOSPITAL | Age: 46
Setting detail: OUTPATIENT SURGERY
Discharge: HOME | End: 2021-04-02
Attending: INTERNAL MEDICINE | Admitting: INTERNAL MEDICINE
Payer: COMMERCIAL

## 2021-04-02 ENCOUNTER — ANESTHESIA EVENT (OUTPATIENT)
Dept: GASTROENTEROLOGY | Facility: HOSPITAL | Age: 46
End: 2021-04-02

## 2021-04-02 VITALS
SYSTOLIC BLOOD PRESSURE: 126 MMHG | HEART RATE: 85 BPM | HEIGHT: 66 IN | WEIGHT: 154.32 LBS | DIASTOLIC BLOOD PRESSURE: 79 MMHG | RESPIRATION RATE: 19 BRPM | OXYGEN SATURATION: 100 % | TEMPERATURE: 97.2 F | BODY MASS INDEX: 24.8 KG/M2

## 2021-04-02 DIAGNOSIS — K21.9 GASTROESOPHAGEAL REFLUX DISEASE WITHOUT ESOPHAGITIS: ICD-10-CM

## 2021-04-02 DIAGNOSIS — K58.1 IRRITABLE BOWEL SYNDROME WITH CONSTIPATION: ICD-10-CM

## 2021-04-02 LAB
EXT PREGNANCY TEST URINE: NEGATIVE
EXT. CONTROL: NORMAL

## 2021-04-02 PROCEDURE — 43239 EGD BIOPSY SINGLE/MULTIPLE: CPT | Performed by: INTERNAL MEDICINE

## 2021-04-02 PROCEDURE — 45378 DIAGNOSTIC COLONOSCOPY: CPT | Performed by: INTERNAL MEDICINE

## 2021-04-02 PROCEDURE — 88305 TISSUE EXAM BY PATHOLOGIST: CPT | Performed by: PATHOLOGY

## 2021-04-02 PROCEDURE — 81025 URINE PREGNANCY TEST: CPT | Performed by: ANESTHESIOLOGY

## 2021-04-02 RX ORDER — LIDOCAINE HYDROCHLORIDE 10 MG/ML
INJECTION, SOLUTION EPIDURAL; INFILTRATION; INTRACAUDAL; PERINEURAL AS NEEDED
Status: DISCONTINUED | OUTPATIENT
Start: 2021-04-02 | End: 2021-04-02

## 2021-04-02 RX ORDER — PROPOFOL 10 MG/ML
INJECTION, EMULSION INTRAVENOUS AS NEEDED
Status: DISCONTINUED | OUTPATIENT
Start: 2021-04-02 | End: 2021-04-02

## 2021-04-02 RX ORDER — SODIUM CHLORIDE, SODIUM LACTATE, POTASSIUM CHLORIDE, CALCIUM CHLORIDE 600; 310; 30; 20 MG/100ML; MG/100ML; MG/100ML; MG/100ML
INJECTION, SOLUTION INTRAVENOUS CONTINUOUS PRN
Status: DISCONTINUED | OUTPATIENT
Start: 2021-04-02 | End: 2021-04-02

## 2021-04-02 RX ADMIN — SODIUM CHLORIDE, SODIUM LACTATE, POTASSIUM CHLORIDE, AND CALCIUM CHLORIDE: .6; .31; .03; .02 INJECTION, SOLUTION INTRAVENOUS at 10:38

## 2021-04-02 RX ADMIN — PROPOFOL 20 MG: 10 INJECTION, EMULSION INTRAVENOUS at 11:09

## 2021-04-02 RX ADMIN — LIDOCAINE HYDROCHLORIDE 50 MG: 10 INJECTION, SOLUTION EPIDURAL; INFILTRATION; INTRACAUDAL; PERINEURAL at 11:00

## 2021-04-02 RX ADMIN — PROPOFOL 120 MG: 10 INJECTION, EMULSION INTRAVENOUS at 11:00

## 2021-04-02 RX ADMIN — PROPOFOL 20 MG: 10 INJECTION, EMULSION INTRAVENOUS at 11:05

## 2021-04-02 RX ADMIN — PROPOFOL 30 MG: 10 INJECTION, EMULSION INTRAVENOUS at 11:03

## 2021-04-02 NOTE — DISCHARGE INSTRUCTIONS
Upper Endoscopy   WHAT YOU NEED TO KNOW:   An upper endoscopy is also called an upper gastrointestinal (GI) endoscopy, or an esophagogastroduodenoscopy (EGD)  It is a procedure to examine the inside of your esophagus, stomach, and duodenum (first part of the small intestine) with a scope  You may feel bloated, gassy, or have some abdominal discomfort after your procedure  Your throat may be sore for 24 to 36 hours  You may burp or pass gas from air that is still inside your body  DISCHARGE INSTRUCTIONS:   Seek care immediately if:    You have sudden, severe abdominal pain   You have problems swallowing   You have a large amount of black, sticky bowel movements or blood in your bowel movements   You have sudden trouble breathing   You feel weak, lightheaded, or faint or your heart beats faster than normal for you  Contact your healthcare provider at [phone] if:    You have a fever and chills   You have nausea or are vomiting   Your abdomen is bloated or feels full and hard   You have abdominal pain   You have black, sticky bowel movements or blood in your bowel movements   You have not had a bowel movement for 3 days after your procedure   You have rash or hives   You have questions or concerns about your procedure  Activity:    Do not lift, strain, or run for 24 hours after your procedure   Rest after your procedure  You have been given medicine to relax you  Do not drive or make important decisions until the day after your procedure  Return to your normal activity as directed   Relieve gas and discomfort from bloating by lying on your right side with a heating pad on your abdomen  You may need to take short walks to help the gas move out  Eat small meals until bloating is relieved  Follow up with your healthcare provider as directed: Write down your questions so you remember to ask them during your visits       If you take a blood thinner, please review the specific instructions from your endoscopist about when you should resume it  These can be found in the Recommendation and Your Medication list sections of this After Visit Summary  Colonoscopy   WHAT YOU NEED TO KNOW:   A colonoscopy is a procedure to examine the inside of your colon (intestine) with a scope  Polyps or tissue growths may have been removed during your colonoscopy  It is normal to feel bloated and to have some abdominal discomfort  You should be passing gas  If you have hemorrhoids or you had polyps removed, you may have a small amount of bleeding  DISCHARGE INSTRUCTIONS:   Seek care immediately if:    You have sudden, severe abdominal pain   You have problems swallowing   You have a large amount of black, sticky bowel movements or blood in your bowel movements   You have sudden trouble breathing   You feel weak, lightheaded, or faint or your heart beats faster than normal for you  Contact your healthcare provider at [phone] if:    You have a fever and chills   You have nausea or are vomiting   Your abdomen is bloated or feels full and hard   You have abdominal pain   You have a large amount of black, sticky bowel movements or blood in your bowel movements   You have not had a bowel movement for 3 days after your procedure   You have rash or hives   You have questions or concerns about your procedure  Activity:    Do not lift, strain, or run for 24 hours after your procedure   Rest after your procedure  You have been given medicine to relax you  Do not drive or make important decisions until the day after your procedure  Return to your normal activity as directed   Relieve gas and discomfort from bloating by lying on your right side with a heating pad on your abdomen  You may need to take short walks to help the gas move out  Eat small meals until bloating is relieved    Follow up with your healthcare provider as directed: Write down your questions so you remember to ask them during your visits  If you take a blood thinner, please review the specific instructions from your endoscopist about when you should resume it  These can be found in the Recommendation and Your Medication list sections of this After Visit Summary

## 2021-04-02 NOTE — ANESTHESIA PREPROCEDURE EVALUATION
Procedure:  COLONOSCOPY  EGD    Relevant Problems   CARDIO   (+) Migraines      NEURO/PSYCH   (+) History of DVT (deep vein thrombosis) (not on A/C)      Other   (+) IBS (irritable bowel syndrome)        Physical Exam    Airway    Mallampati score: II  TM Distance: >3 FB  Neck ROM: full     Dental       Cardiovascular      Pulmonary      Other Findings        Anesthesia Plan  ASA Score- 1     Anesthesia Type- IV sedation with anesthesia with ASA Monitors  Additional Monitors:   Airway Plan:     Comment: Recent labs personally reviewed:  Lab Results       Component                Value               Date                       WBC                      5 65                01/20/2021                 HGB                      14 7                01/20/2021                 PLT                      351                 01/20/2021            Lab Results       Component                Value               Date                       K                        4 1                 01/20/2021                 BUN                      19                  01/20/2021                 CREATININE               1 11                01/20/2021            No results found for: PTT   No results found for: INR    Blood type     I, Ivon Espinoza MD, have personally seen and evaluated the patient prior to anesthetic care  I have reviewed the pre-anesthetic record, medical history, allergies, medications and any other medical records if appropriate to the anesthetic care  If a CRNA is involved in the case, I have reviewed the CRNA assessment, if present, and agree  Patient consented for IV Sedation, general anesthesia as back up  Discussed risks of aspiration, IV infiltration, indications for conversion to general anesthesia  All questions and concerns addressed          Plan Factors-Exercise tolerance (METS): >4 METS  Chart reviewed  Patient is not a current smoker  Patient did not smoke on day of surgery      Obstructive sleep apnea risk education given perioperatively  Induction- intravenous  Postoperative Plan-     Informed Consent- Anesthetic plan and risks discussed with patient  I personally reviewed this patient with the CRNA  Discussed and agreed on the Anesthesia Plan with the CRNA  Karrie Nissen

## 2021-04-02 NOTE — ANESTHESIA POSTPROCEDURE EVALUATION
Post-Op Assessment Note    CV Status:  Stable    Pain management: adequate     Mental Status:  Alert and awake   Hydration Status:  Euvolemic   PONV Controlled:  Controlled   Airway Patency:  Patent      Post Op Vitals Reviewed: Yes      Staff: CRNA         No complications documented      BP   122/70   Temp  98 3   Pulse  76   Resp   18   SpO2   100

## 2021-04-02 NOTE — INTERVAL H&P NOTE
H&P reviewed  After examining the patient I find no changes in the patients condition since the H&P had been written      Vitals:    04/02/21 1014   BP: 115/77   Pulse: 95   Resp: 13   Temp: (!) 97 °F (36 1 °C)   SpO2: 98%

## 2021-04-05 ENCOUNTER — IMMUNIZATIONS (OUTPATIENT)
Dept: FAMILY MEDICINE CLINIC | Facility: HOSPITAL | Age: 46
End: 2021-04-05

## 2021-04-05 DIAGNOSIS — Z23 ENCOUNTER FOR IMMUNIZATION: Primary | ICD-10-CM

## 2021-04-05 PROCEDURE — 0002A SARS-COV-2 / COVID-19 MRNA VACCINE (PFIZER-BIONTECH) 30 MCG: CPT

## 2021-04-05 PROCEDURE — 91300 SARS-COV-2 / COVID-19 MRNA VACCINE (PFIZER-BIONTECH) 30 MCG: CPT

## 2021-04-06 ENCOUNTER — TELEPHONE (OUTPATIENT)
Dept: GASTROENTEROLOGY | Facility: CLINIC | Age: 46
End: 2021-04-06

## 2021-04-06 ENCOUNTER — TELEPHONE (OUTPATIENT)
Dept: PAIN MEDICINE | Facility: CLINIC | Age: 46
End: 2021-04-06

## 2021-04-06 NOTE — TELEPHONE ENCOUNTER
----- Message from Zach Pardo MD sent at 4/6/2021  8:04 AM EDT -----  Please tell her that all of the biopsies were negative

## 2021-04-06 NOTE — TELEPHONE ENCOUNTER
No email  Spoke to patient  They are on her phone she is bringing to tomorrows appt  Geo Escobar MRIs from [place]

## 2021-04-07 ENCOUNTER — CONSULT (OUTPATIENT)
Dept: PAIN MEDICINE | Facility: CLINIC | Age: 46
End: 2021-04-07
Payer: COMMERCIAL

## 2021-04-07 VITALS
RESPIRATION RATE: 16 BRPM | SYSTOLIC BLOOD PRESSURE: 110 MMHG | BODY MASS INDEX: 25.23 KG/M2 | WEIGHT: 157 LBS | HEART RATE: 94 BPM | HEIGHT: 66 IN | DIASTOLIC BLOOD PRESSURE: 79 MMHG

## 2021-04-07 DIAGNOSIS — M62.838 CERVICAL PARASPINAL MUSCLE SPASM: ICD-10-CM

## 2021-04-07 DIAGNOSIS — G89.4 CHRONIC PAIN SYNDROME: Primary | ICD-10-CM

## 2021-04-07 DIAGNOSIS — M79.7 FIBROMYALGIA: ICD-10-CM

## 2021-04-07 DIAGNOSIS — M79.18 MYOFASCIAL PAIN SYNDROME: ICD-10-CM

## 2021-04-07 PROCEDURE — 99204 OFFICE O/P NEW MOD 45 MIN: CPT | Performed by: STUDENT IN AN ORGANIZED HEALTH CARE EDUCATION/TRAINING PROGRAM

## 2021-04-07 NOTE — PATIENT INSTRUCTIONS
Trigger Point Injection   AMBULATORY CARE:   A trigger point injection  is used to relax a muscle knot  This helps relieve pain  You may be able to have more than one trigger point treated during a session  How to prepare for a trigger point injection:   · Your healthcare provider will tell you how to prepare  Arrange to have someone drive you home after the injection  · Tell your provider about all medicines you take, including pain medicine, blood thinners, and muscle relaxers  He or she will tell you if you need to stop any medicine for the injection, and when to stop  He or she will tell you which medicines to take or not take on the day of the injection  · Tell your provider about all your allergies, including to any pain medicine  What will happen during a trigger point injection:   · You may be sitting or lying, depending on where the trigger point is located  Your healthcare provider will feel for a knot in the muscle  He or she may federico your skin over the knot  · Your provider will put a needle through your skin and into the trigger point  Saline (salt solution), pain relievers, or other medicines may be pushed through the needle into the trigger point  Your provider may use only a dry needle (no medicine)  He or she will pull the needle almost all the way out and then push it in again  He or she will repeat this several times until the muscle stops twitching or feels relaxed  · Your provider will remove the needle and stretch the muscle area  He or she may apply pressure to the area for 2 minutes  A bandage will be put over the injection site to prevent bleeding or an infection  What to expect after a trigger point injection:   · You may feel pain relief right away  It is normal for some pain to start again 2 hours later  An ice pack or over-the-counter pain medicine can help lower the pain  · You may feel sore in the injection site for a few days   If you need another injection in the same area, wait until the area is not sore  · Your healthcare provider may give you specific activity instructions to follow at home or recommend physical therapy  In general, you should try to stay active  Avoid strenuous activity for the first 3 or 4 days after the injection  · Do not have more injections if you still have trigger point pain after 2 or 3 injections  Risks of a trigger point injection:  You may have a severe allergic reaction to pain medicine injected  The injection may be painful, or you may be sore where you got the injection  You may bleed, bruise, or develop an infection in the injection area  The injection may cause you to feel faint  Rarely, the needle may cause muscle or blood vessel damage or your lung may collapse if you get the injection near your chest   Call your local emergency number (911 in the [address] [place] [address],[address]), or have someone call if:   · Your mouth and throat are swollen  · You are wheezing or have trouble breathing  · You have chest pain or your heart is beating faster than usual     · You feel like you are going to faint  When should I seek immediate care? · Your face is red or swollen  · You have hives that spread over your body  · You feel weak or dizzy  Call your doctor or pain specialist if:   · You have a fever within 1 week of the injection  · You have redness or swelling within 1 week of the injection  · You have new or worsening pain near the injection site  · You have questions or concerns about your condition or care  Self-care:   · Stay active after you have trigger point injections  Gently move your joints through their full range of motion during the first week  Avoid strenuous activity for 3 or 4 days  · Do regular stretches of the trigger point muscle  Place gentle pressure on the trigger point, and then stretch the muscle  Ask your healthcare provider for more information about how to stretch and apply pressure      · Apply ice to the injection site  Use an ice pack, or put ice in a plastic bag  Cover the bag with a towel before you apply it  Apply ice for 15 to 20 minutes every hour, or as directed  · Apply heat to trigger point sites  Heat can help relax muscles and relieve trigger point pain  Use a heat pack or a heating pad set on low  Apply heat for 15 minutes every hour, or as directed  Follow up with your doctor or pain specialist as directed:  Write down your questions so you remember to ask them during your visits  © Copyright [address] Information is for End User's use only and may not be sold, redistributed or otherwise used for commercial purposes  All illustrations and images included in CareNotes® are the copyrighted property of [employer]  or [address] [place], [place] [address]ayla warner   The above information is an  only  It is not intended as medical advice for individual conditions or treatments  Talk to your doctor, nurse or pharmacist before following any medical regimen to see if it is safe and effective for you

## 2021-04-07 NOTE — PROGRESS NOTES
Assessment  1  Chronic pain syndrome    2  Fibromyalgia    3  Myofascial pain syndrome    4  Cervical paraspinal muscle spasm        Plan   this is a 67-year-old female who presents to our office with chief complaint of widespread pain,  But most notably has increased neck and low back pain which appear to be myofascial in nature  On exam she has notable hypertonicity in the cervical and lumbar paraspinous musculature along with identifiable trigger points most notable in the cervical  And right trapezius musculature  She does have notable history of anterior  C4-5 C5-6 fusion in 2018 which greatly improved her range of motion  She does not appear to have very advanced degenerative changes on imaging but does appear to have reversal of lordosis based on my interpretation  Therefore, we will give her therapy towards treating her myofascial symptoms  In the low back, she does have some pain with lumbar extension in the setting of facet arthropathy noted on previous MRI  Unclear if she underwent MBB in the past   This would be the next step in the treatment plan if she continues to have persistent low back pain after trigger point injection  We also discussed the importance and benefits of physical therapy in addressing the myofascial components of the pain and improving the patient's body mechanics via a home exercise and stretching program  After discussing possible benefits and indications for physical therapy, the patient was agreeable and a prescription was provided  Regular participation in his home exercise program was discussed and recommended  Recommended specifically to aqua therapy  I did offer the patient a p r n  muscle relaxant to take, but patient declined at this time, she is trying to avoid taking medications  Given the patient's symptoms, examination findings and imaging results noted above, I discussed the utility of proceeding with a trigger point injection    Using an anatomical model, the procedure, as well as its potential risks, benefits, and reasonable alternatives were discussed in detail  Discussed risks of the procedure included but are not limited to bleeding, infection, allergic reaction, nerve damage, hematoma fomation, abscess formation, failure of the pain to improve and potential worsening of the pain  Since Ms Schuster Began has failed at least 6 weeks of conservative measures including over-the-counter pain medications and prescription medications it is reasonable to proceed with the above injection  The patient verbalized understanding to the potential risks, benefits, and reasonable alternatives to the above injection and wishes to proceed  Her response will help to further determine a treatment plan  [place] Prescription Drug Monitoring Program report was reviewed and was appropriate     My impressions and treatment recommendations were discussed in detail with the patient who verbalized understanding and had no further questions  Discharge instructions were provided  I personally saw and examined the patient and I agree with the above discussed plan of care  Orders Placed This Encounter   Procedures    FL spine and pain procedure     Standing Status:   Future     Standing Expiration Date:   4/7/2025     Order Specific Question:   Reason for Exam:     Answer:   trigger point injection     Order Specific Question:   Is the patient pregnant? Answer:   Unknown     Order Specific Question:   Anticoagulant hold needed? Answer:   No    Ambulatory referral to Physical Therapy     Standing Status:   Future     Standing Expiration Date:   4/7/2022     Referral Priority:   Routine     Referral Type:   Physical Therapy     Referral Reason:   Specialty Services Required     Requested Specialty:   Physical Therapy     Number of Visits Requested:   1     Expiration Date:   4/7/2022     No orders of the defined types were placed in this encounter        History of Present Illness    Referring Provider: Self, Referral    Edna Hicks is a 41 y o  female who presents with a chief complaint of  Total body pain, worse in the neck  This is a chronic issue for over 8 years  Began after injury at work in 2012  She has moderate to severe pain throughout the last month  Current pain is 7 out of 10  Pain is constant  Pain is notable at all times of the day  Described as burning, sharp, pressure-like  She reports weakness in the bilateral lower extremities but denies bowel bladder incontinence or saddle anesthesia  She reports increase in her symptoms with bending, sitting  No change with lying down, standing, walking  Reports pain throughout the whole body, neck, bilateral arms, middle of back, low back, and bilateral legs  She has seen pain management for while living in [place] in 2017  Has had epidural injections in lower back and "block" which were ineffective, which by descriptions seems like possible MBB  Past medical history includes   Anterior neck fusion at C4-5 and C5-6 in 2018  Past medical history includes fibromyalgia  Also includes GERD, IBS  She reports that surgery provided her moderately  No relief with nerve blocks, PT  Moderate relief with exercise  Moderate relief with heat/ ice  Moderate relief with 10s unit and chiropractor  No relief with acupuncture  Does not smoke tobacco or marijuana does not drink alcohol  Current medications include acetaminophen, aspirin, and ibuprofen with no significant relief  I have personally reviewed and/or updated the patient's past medical history, past surgical history, family history, social history, current medications, allergies, and vital signs today  Review of Systems   Constitutional: Negative for fever and unexpected weight change  HENT: Negative for trouble swallowing  Eyes: Negative for visual disturbance  Respiratory: Negative for shortness of breath and wheezing  Cardiovascular: Negative for chest pain and palpitations  Gastrointestinal: Negative for constipation, diarrhea, nausea and vomiting  Endocrine: Negative for cold intolerance, heat intolerance and polydipsia  Genitourinary: Negative for difficulty urinating and frequency  Musculoskeletal: Positive for arthralgias and myalgias  Negative for gait problem and joint swelling  Skin: Negative for rash  Neurological: Positive for dizziness  Negative for seizures, syncope, weakness and headaches  Hematological: Does not bruise/bleed easily  Psychiatric/Behavioral: Negative for dysphoric mood  All other systems reviewed and are negative  Patient Active Problem List   Diagnosis    Vulvar itching    History of DVT (deep vein thrombosis)    Family history of diabetes mellitus in father    Encntr for gyn exam (general) (routine) w/o abn findings    IBS (irritable bowel syndrome)    Migraines       Past Medical History:   Diagnosis Date    Chlamydia     GERD (gastroesophageal reflux disease)     IBS (irritable bowel syndrome)     Migraines        Past Surgical History:   Procedure Laterality Date    FOOT SURGERY Bilateral     NECK SURGERY         Family History   Problem Relation Age of Onset    Hyperlipidemia Mother     Diabetes Father        Social History     Occupational History    Not on file   Tobacco Use    Smoking status: Never Smoker    Smokeless tobacco: Never Used   Substance and Sexual Activity    Alcohol use: Yes     Frequency: Monthly or less     Comment: social    Drug use: Never    Sexual activity: Not Currently     Partners: Male     Birth control/protection: I U D         Current Outpatient Medications on File Prior to Visit   Medication Sig    aspirin (Aspirin 81) 81 mg EC tablet Take 1 tablet by mouth daily     clobetasol (TEMOVATE) 0 05 % ointment Apply topically 2 (two) times a day    dicyclomine (BENTYL) 20 mg tablet Take 1 tablet (20 mg total) by mouth every 6 (six) hours as needed (abdominal cramping)    famotidine (PEPCID) 10 mg tablet Take 20 mg by mouth daily     intrauterine copper (PARAGARD) IUD 1 each by Intrauterine route once    omeprazole (PriLOSEC) 40 MG capsule Take 40 mg by mouth daily      No current facility-administered medications on file prior to visit  No Known Allergies    Physical Exam    /79   Pulse 94   Resp 16   Ht 5' 6" (1 676 m)   Wt 71 2 kg (157 lb)   LMP 03/15/2021 (Approximate)   BMI 25 34 kg/m²     Constitutional: normal, well developed, well nourished, alert, in no distress and non-toxic and no overt pain behavior    Eyes: anicteric  HEENT: grossly intact  Neck: supple, symmetric, trachea midline and no masses   Pulmonary:even and unlabored  Cardiovascular:No edema or pitting edema present  Skin:Normal without rashes or lesions and well hydrated  Psychiatric:Mood and affect appropriate  Neurologic:Cranial Nerves II-XII grossly intact  Musculoskeletal:normal     Cervical Spine Exam    Appearance:  Reversal of lordosis  Palpation/Tenderness:  left cervical paraspinal tenderness  right cervical paraspinal tenderness  left trapezium tenderness  right trapezium tenderness  trigger points palpable  Sensory:  no sensory deficits noted  Range of Motion:  Full range of motion with no pain or limitations in flexion, extension, lateral flexion and rotation  Motor Strength:  Left Arm Flexion  5/5  Left Arm Extension  5/5  Right Arm Flexion  5/5  Right Arm Extension  5/5  Left Wrist Flexion  5/5  Left Wrist Extension  5/5  Left Finger Abduction  5/5  Right Finger Abduction  5/5  Left Pincer Grasp  5/5  Right Pincer Grasp  5/5  Left    5/5  Right   5/5  Reflexes:  Left Biceps:  2+   Right Biceps:  2+   Left Brachioradialis:  2+   Right Brachioradialis:  2+   Left Triceps:  2+   Right Triceps:  2+   Special Tests:  Left Spurlings:  negative  Right Spurlings  negative      Lumbar Spine Exam    Appearance:  Normal lordosis  Palpation/Tenderness:  left lumbar paraspinal tenderness  right lumbar paraspinal tenderness  Sensory:  no sensory deficits noted  Range of Motion:  Full range of motion with no pain or limitations in flexion, extension, lateral flexion and rotation   Except for pain with lumbar extension      DIAGNOSTIC IMAGING AND TEST RESULTS:  Lumbar MRI 09/2020: Spondylosis primarily at the L3-4 level where there is mild/mod neural foraminal stenosis, R>L  Mild bilateral facet facet arthropathy at L3-4 and L4-5    Cervical xray reviewed in Epic: shows intact hardware with mild degenerative changes per my interpretation

## 2021-04-09 ENCOUNTER — TELEPHONE (OUTPATIENT)
Dept: PAIN MEDICINE | Facility: CLINIC | Age: 46
End: 2021-04-09

## 2021-04-09 NOTE — TELEPHONE ENCOUNTER
Pt called asking how long it takes to get insurance approval for a TPI? When pt saw SP he told her he was going to get a TPI approved for her  when it is approved robert you please call pt right away and schedule it      Pt # [phone]

## 2021-04-09 NOTE — TELEPHONE ENCOUNTER
Please schedule TPI w/Dr Bridget Ardon      [facility] [id number]  4/9/21 901 am

## 2021-04-15 NOTE — TELEPHONE ENCOUNTER
Pt would like to know if theres any openings for 4/20 instead- if not, please leave that appmt for 4/19  Thank you       víctor [phone]

## 2021-04-16 NOTE — TELEPHONE ENCOUNTER
Please reach back out to patient, she needs to ask a question about her appt date  She wants to know if there is a opening?     Appt: 4/19/21

## 2021-04-19 ENCOUNTER — OFFICE VISIT (OUTPATIENT)
Dept: GASTROENTEROLOGY | Facility: CLINIC | Age: 46
End: 2021-04-19
Payer: COMMERCIAL

## 2021-04-19 ENCOUNTER — PROCEDURE VISIT (OUTPATIENT)
Dept: PAIN MEDICINE | Facility: CLINIC | Age: 46
End: 2021-04-19
Payer: COMMERCIAL

## 2021-04-19 VITALS
SYSTOLIC BLOOD PRESSURE: 107 MMHG | WEIGHT: 157.2 LBS | DIASTOLIC BLOOD PRESSURE: 73 MMHG | HEART RATE: 87 BPM | RESPIRATION RATE: 16 BRPM | HEIGHT: 66 IN | BODY MASS INDEX: 25.26 KG/M2

## 2021-04-19 VITALS
DIASTOLIC BLOOD PRESSURE: 70 MMHG | BODY MASS INDEX: 25.39 KG/M2 | HEIGHT: 66 IN | SYSTOLIC BLOOD PRESSURE: 110 MMHG | HEART RATE: 79 BPM | WEIGHT: 158 LBS

## 2021-04-19 DIAGNOSIS — M62.838 CERVICAL PARASPINAL MUSCLE SPASM: ICD-10-CM

## 2021-04-19 DIAGNOSIS — K58.1 IRRITABLE BOWEL SYNDROME WITH CONSTIPATION: Primary | ICD-10-CM

## 2021-04-19 DIAGNOSIS — M79.18 MYOFASCIAL PAIN SYNDROME: Primary | ICD-10-CM

## 2021-04-19 DIAGNOSIS — M62.838 TRAPEZIUS MUSCLE SPASM: ICD-10-CM

## 2021-04-19 PROCEDURE — 20553 NJX 1/MLT TRIGGER POINTS 3/>: CPT | Performed by: STUDENT IN AN ORGANIZED HEALTH CARE EDUCATION/TRAINING PROGRAM

## 2021-04-19 PROCEDURE — 99213 OFFICE O/P EST LOW 20 MIN: CPT | Performed by: PHYSICIAN ASSISTANT

## 2021-04-19 PROCEDURE — 1036F TOBACCO NON-USER: CPT | Performed by: PHYSICIAN ASSISTANT

## 2021-04-19 PROCEDURE — 3008F BODY MASS INDEX DOCD: CPT | Performed by: PHYSICIAN ASSISTANT

## 2021-04-19 RX ORDER — METHYLPREDNISOLONE ACETATE 40 MG/ML
40 INJECTION, SUSPENSION INTRA-ARTICULAR; INTRALESIONAL; INTRAMUSCULAR; SOFT TISSUE ONCE
Status: COMPLETED | OUTPATIENT
Start: 2021-04-19 | End: 2021-04-19

## 2021-04-19 RX ORDER — BUPIVACAINE HYDROCHLORIDE 2.5 MG/ML
8 INJECTION, SOLUTION EPIDURAL; INFILTRATION; INTRACAUDAL ONCE
Status: COMPLETED | OUTPATIENT
Start: 2021-04-19 | End: 2021-04-19

## 2021-04-19 RX ADMIN — METHYLPREDNISOLONE ACETATE 40 MG: 40 INJECTION, SUSPENSION INTRA-ARTICULAR; INTRALESIONAL; INTRAMUSCULAR; SOFT TISSUE at 08:27

## 2021-04-19 RX ADMIN — BUPIVACAINE HYDROCHLORIDE 8 ML: 2.5 INJECTION, SOLUTION EPIDURAL; INFILTRATION; INTRACAUDAL at 08:27

## 2021-04-19 NOTE — PROGRESS NOTES
Inj Trigger Point Single/Multiple     Date/Time 4/19/2021 8:05 AM     Performed by  Kimani Garcia MD     Authorized by Kimani Garcia MD      Universal Protocol   Consent: Written consent obtained  Risks and benefits: risks, benefits and alternatives were discussed  Consent given by: patient  Time out: Immediately prior to procedure a "time out" was called to verify the correct patient, procedure, equipment, support staff and site/side marked as required  Timeout called at: 4/19/2021 8:05 AM   Patient understanding: patient states understanding of the procedure being performed  Patient consent: the patient's understanding of the procedure matches consent given  Procedure consent: procedure consent matches procedure scheduled  Relevant documents: relevant documents present and verified  Test results: test results available and properly labeled  Site marked: the operative site was marked  Required items: required blood products, implants, devices, and special equipment available  Patient identity confirmed: verbally with patient        Local anesthesia used: yes      Anesthesia: local infiltration     Anesthesia   Local anesthesia used: yes  Local Anesthetic: bupivacaine 0 25% without epinephrine  Anesthetic total: 9 mL     Sedation   Patient sedated: no        Specimen: no    Culture: no   Procedure Details   Procedure Notes: PROCEDURE:  Trigger point injection x 9 with local anesthetic and steroid in the bilateral trapezius and bilateral splenius capitis muscle groups  ATTENDING PHYSICIAN:  JANEE Luo  PREPROCEDURE DIAGNOSIS:  Myofascial pain with identifiable trigger points  POSTPROCEDURE DIAGNOSIS:  Myofascial pain with identifiable trigger points  ANESTHESIA:  Local    ESTIMATED BLOOD LOSS:  Minimal    COMPLICATIONS:  None      CONSENT:  Today's procedure, its potential benefits as well as its risks and potential side effects were reviewed    Discussed risks of the procedure include bleeding, infection, nerve irritation or damage, reactions to the medications, failure of the pain to improve and exacerbation of the pain were explained to the patient, who verbalized understanding and who wished to proceed  Informed consent was signed  DESCRIPTION OF THE PROCEDURE:  After informed consent was obtained, the patient was placed in the seated position  9 trigger points were identified via palpation and marked with a surgical skin marker  The skin was prepped with antiseptic in the usual sterile fashion  Strict aseptic technique was utilized throughout the procedure  A 25 gauge, 1 ½ inch needle was then advanced into each identified trigger point  An injectate consisting of 8 ml of 0 25% marcaine with 1 mL of 40mg/mL depo-medrol was slowly injected in divided doses after negative aspiration  The needle was removed with tip intact  The patient tolerated the procedure and hemostasis was maintained  There were no apparent paresthesias or complications  The skin was wiped clean, and a band-aid was placed as appropriate  The patient was monitored for an appropriate period of time following the procedure and remained hemodynamically stable and neurovascularly intact  The patient was ultimately discharged to home with supervision in good condition and instructed to call the office to report the response       Patient tolerance: Patient tolerated the procedure well with no immediate complications

## 2021-04-19 NOTE — PROGRESS NOTES
[facility]  Tayler Villela 41 y o  female MRN: [id number]       CC: Follow-up after EGD/colonoscopy     HPI: Cris Ruiz is a 41year old female who initially presented to the office in March for chronic GI symptoms  Because she was reporting bowel movements on a weekly basis, I gave patient Linzess 290 mcg samples  EGD and colonoscopy in early April were both normal  Biopsies negative  Patient reports that she did trial the  Bentyl, but was confused about which 1 was the antispasm pill and which was for constipation  She reports that she only required Bentyl once for cramping, she reports that it did help her spasming pain  She is still having bowel movements only weekly at this point  Review of Systems:    CONSTITUTIONAL: Denies any fever, chills, or rigors  Good appetite, and no recent weight loss  HEENT: No earache or tinnitus  Denies hearing loss or visual disturbances  CARDIOVASCULAR: No chest pain or palpitations  RESPIRATORY: Denies any cough, hemoptysis, shortness of breath or dyspnea on exertion  GASTROINTESTINAL: As noted in the History of Present Illness  GENITOURINARY: No problems with urination  Denies any hematuria or dysuria  NEUROLOGIC: No dizziness or vertigo, denies headaches  MUSCULOSKELETAL: Denies any muscle or joint pain  SKIN: Denies skin rashes or itching  ENDOCRINE: Denies excessive thirst  Denies intolerance to heat or cold  PSYCHOSOCIAL: Denies depression or anxiety  Denies any recent memory loss         Current Outpatient Medications   Medication Sig Dispense Refill    aspirin (Aspirin 81) 81 mg EC tablet Take 1 tablet by mouth daily       clobetasol (TEMOVATE) 0 05 % ointment Apply topically 2 (two) times a day 30 g 0    dicyclomine (BENTYL) 20 mg tablet Take 1 tablet (20 mg total) by mouth every 6 (six) hours as needed (abdominal cramping) 30 tablet 0    famotidine (PEPCID) 10 mg tablet Take 20 mg by mouth daily       intrauterine copper [facility]) IUD 1 each by Intrauterine route once      omeprazole (PriLOSEC) 40 MG capsule Take 40 mg by mouth daily        No current facility-administered medications for this visit  Past Medical History:   Diagnosis Date    Chlamydia     GERD (gastroesophageal reflux disease)     IBS (irritable bowel syndrome)     Migraines      Past Surgical History:   Procedure Laterality Date    FOOT SURGERY Bilateral     NECK SURGERY       Social History     Socioeconomic History    Marital status: Single     Spouse name: Not on file    Number of children: Not on file    Years of education: Not on file    Highest education level: Not on file   Occupational History    Not on file   Social Needs    Financial resource strain: Not on file    Food insecurity     Worry: Not on file     Inability: Not on file    Transportation needs     Medical: Not on file     Non-medical: Not on file   Tobacco Use    Smoking status: Never Smoker    Smokeless tobacco: Never Used   Substance and Sexual Activity    Alcohol use: Yes     Frequency: Monthly or less     Comment: social    Drug use: Never    Sexual activity: Not Currently     Partners: Male     Birth control/protection: I U D     Lifestyle    Physical activity     Days per week: Not on file     Minutes per session: Not on file    Stress: Not on file   Relationships    Social connections     Talks on phone: Not on file     Gets together: Not on file     Attends Taoism service: Not on file     Active member of club or organization: Not on file     Attends meetings of clubs or organizations: Not on file     Relationship status: Not on file    Intimate partner violence     Fear of current or ex partner: Not on file     Emotionally abused: Not on file     Physically abused: Not on file     Forced sexual activity: Not on file   Other Topics Concern    Not on file   Social History Narrative    Not on file     Family History   Problem Relation Age of Onset    Hyperlipidemia Mother     Diabetes Father             PHYSICAL EXAM:    There were no vitals filed for this visit  General Appearance:   Alert and oriented x 3  Cooperative, and in no respiratory distress   HEENT:   Normocephalic, atraumatic, anicteric      Neck:  Supple, symmetrical, trachea midline   Lungs:   Clear to auscultation bilaterally    Heart[de-identified]   Regular rate and rhythm   Abdomen:   Soft, non-tender, non-distended; normal bowel sounds; no masses, no organomegaly    Genitalia:   Deferred    Rectal:   Deferred    Extremities:  No cyanosis, clubbing or edema    Pulses:  2+ and symmetric all extremities    Skin:  Skin color, texture, turgor normal, no rashes or lesions    Lymph nodes:  No palpable cervical or supraclavicular lymphadenopathy        Lab Results:   Results from last 6 Months   Lab Units 01/20/21  0907   WBC Thousand/uL 5 65   HEMOGLOBIN g/dL 14 7   HEMATOCRIT % 45 5   PLATELETS Thousands/uL 351   NEUTROS PCT % 53   LYMPHS PCT % 35   MONOS PCT % 8   EOS PCT % 3     Results from last 6 Months   Lab Units 01/20/21  0907   POTASSIUM mmol/L 4 1   CHLORIDE mmol/L 104   CO2 mmol/L 29   BUN mg/dL 19   CREATININE mg/dL 1 11   CALCIUM mg/dL 9 0               Imaging Studies: I have personally reviewed pertinent imaging studies  No results found  ASSESSMENT and PLAN:      1) Irritable bowel syndrome, constipation predominant -  Patient has not required Bentyl, but reports that she did have a good response when she did take it  She has not trialed Linzess yet  When she sees her parents in [place], she will trial the medication once she is there  I told her to feel free to contact the office if she is happy with this dose, or if she needs it to be decreased to 145 micro g  She voices understanding  Follow up in 2 months

## 2021-05-25 ENCOUNTER — HOSPITAL ENCOUNTER (OUTPATIENT)
Dept: MAMMOGRAPHY | Facility: CLINIC | Age: 46
Discharge: HOME | End: 2021-05-25
Payer: COMMERCIAL

## 2021-05-25 ENCOUNTER — CLINICAL SUPPORT (OUTPATIENT)
Dept: OBGYN CLINIC | Facility: CLINIC | Age: 46
End: 2021-05-25
Payer: COMMERCIAL

## 2021-05-25 ENCOUNTER — TELEPHONE (OUTPATIENT)
Dept: NEUROLOGY | Facility: CLINIC | Age: 46
End: 2021-05-25

## 2021-05-25 VITALS
BODY MASS INDEX: 25.78 KG/M2 | DIASTOLIC BLOOD PRESSURE: 72 MMHG | WEIGHT: 160.4 LBS | HEIGHT: 66 IN | SYSTOLIC BLOOD PRESSURE: 118 MMHG

## 2021-05-25 DIAGNOSIS — Z23 NEED FOR HPV VACCINATION: Primary | ICD-10-CM

## 2021-05-25 DIAGNOSIS — Z12.31 OTHER SCREENING MAMMOGRAM: ICD-10-CM

## 2021-05-25 PROCEDURE — 3008F BODY MASS INDEX DOCD: CPT | Performed by: PHYSICIAN ASSISTANT

## 2021-05-25 PROCEDURE — 90651 9VHPV VACCINE 2/3 DOSE IM: CPT

## 2021-05-25 PROCEDURE — 77067 SCR MAMMO BI INCL CAD: CPT

## 2021-05-25 PROCEDURE — 77063 BREAST TOMOSYNTHESIS BI: CPT

## 2021-05-25 PROCEDURE — 90471 IMMUNIZATION ADMIN: CPT

## 2021-05-25 NOTE — TELEPHONE ENCOUNTER
[id number] with patient and she declined an appointment/services at this time  I offered assistance in the future, patient appreciated the call and information

## 2021-05-25 NOTE — PROGRESS NOTES
Pt presents for second gardasil vaccine   Given in left arm   Tolerated well   LOT: [id number]  EXP: [id number]

## 2021-06-04 ENCOUNTER — TELEPHONE (OUTPATIENT)
Dept: GASTROENTEROLOGY | Facility: CLINIC | Age: 46
End: 2021-06-04

## 2021-06-04 NOTE — TELEPHONE ENCOUNTER
Pt sent message via [employer]  Please advise  Thanks  Please send you reply back to the clinical bin  Thanks

## 2021-06-04 NOTE — TELEPHONE ENCOUNTER
----- Message from Abelino Horowitz sent at 6/4/2021  8:20 AM EDT -----  Regarding: Prescription Question  Contact: [phone]  Mitchell Pang,    I started the Erenest Ace 290mcg this past Saturday  The first 4 days it phani worked with some side effects  The last 2 days, I havent went at all  Should I continue? Our next appointment is July 7    Thank you,  Abelino Horowitz

## 2021-06-04 NOTE — TELEPHONE ENCOUNTER
Yes, I want her to continue since I do not think it has been enough time to see consistency  Thanks!